# Patient Record
Sex: FEMALE | NOT HISPANIC OR LATINO
[De-identification: names, ages, dates, MRNs, and addresses within clinical notes are randomized per-mention and may not be internally consistent; named-entity substitution may affect disease eponyms.]

---

## 2022-04-25 ENCOUNTER — APPOINTMENT (OUTPATIENT)
Dept: CARDIOLOGY | Facility: CLINIC | Age: 77
End: 2022-04-25
Payer: MEDICARE

## 2022-04-25 ENCOUNTER — APPOINTMENT (OUTPATIENT)
Dept: ELECTROPHYSIOLOGY | Facility: CLINIC | Age: 77
End: 2022-04-25

## 2022-04-25 VITALS — SYSTOLIC BLOOD PRESSURE: 155 MMHG | HEART RATE: 75 BPM | DIASTOLIC BLOOD PRESSURE: 80 MMHG

## 2022-04-25 VITALS — BODY MASS INDEX: 26.9 KG/M2 | WEIGHT: 137 LBS | HEIGHT: 60 IN

## 2022-04-25 DIAGNOSIS — I10 ESSENTIAL (PRIMARY) HYPERTENSION: ICD-10-CM

## 2022-04-25 DIAGNOSIS — R00.2 PALPITATIONS: ICD-10-CM

## 2022-04-25 DIAGNOSIS — R01.1 CARDIAC MURMUR, UNSPECIFIED: ICD-10-CM

## 2022-04-25 PROBLEM — Z00.00 ENCOUNTER FOR PREVENTIVE HEALTH EXAMINATION: Status: ACTIVE | Noted: 2022-04-25

## 2022-04-25 PROCEDURE — 99204 OFFICE O/P NEW MOD 45 MIN: CPT

## 2022-04-25 NOTE — REVIEW OF SYSTEMS
[Palpitations] : palpitations [Negative] : Heme/Lymph [Fever] : no fever [Headache] : no headache [Chills] : no chills [Feeling Fatigued] : not feeling fatigued [SOB] : no shortness of breath [Dyspnea on exertion] : not dyspnea during exertion [Chest Discomfort] : no chest discomfort [Lower Ext Edema] : no extremity edema [Leg Claudication] : no intermittent leg claudication [Orthopnea] : no orthopnea [PND] : no PND [Syncope] : no syncope [Cough] : no cough [Wheezing] : no wheezing [Coughing Up Blood] : no hemoptysis [Abdominal Pain] : no abdominal pain [Nausea] : no nausea [Vomiting] : no vomiting [Diarrhea] : diarrhea

## 2022-04-25 NOTE — PHYSICAL EXAM
[No Xanthelasma] : no xanthelasma [Normal S1, S2] : normal S1, S2 [No Rub] : no rub [No Gallop] : no gallop [Soft] : abdomen soft [Non Tender] : non-tender [Normal Radial B/L] : normal radial B/L [No Rash] : no rash [Normal] : alert and oriented, normal memory [de-identified] : + JUAN 3/6 RSB

## 2022-04-25 NOTE — DISCUSSION/SUMMARY
[FreeTextEntry1] : 76 yo woman with HTN on meds, pre-DM HbA1C 6.1, , who presents for cardiac evaluation.  \par \par Palpitations noted to have PVCs on EKG and noted irregular rhythm on exam\par -Will obtain event monitor to rule out arrhythmias\par \par Murmur/PVCs/HTN\par -Will check TTE to rule out structural heart disease \par \par HTN above goal today which pt attributes to being nervous seeing a cardiologist.  Normally she is 130s/80s.  We discussed importance of  low salt intake, regular exercise per AHA guidelines.  ASCVD 12% 10 year cardiovascular risk.  She wishes to address it with lifestyle modifications at this point.  I recommend close repeat of labs with her PMD and then further discussion based on results.  \par \par I have spent 45 min on this encounter by seeing the pt, reviewing previous records, labs, etc, discussing plan of care and documenting this encounter.

## 2022-04-25 NOTE — HISTORY OF PRESENT ILLNESS
[FreeTextEntry1] : 76 yo woman with HTN on meds, pre-DM HbA1C 6.1, , who presents for cardiac evaluation.  Pt referred by Dr. Reina.  She does report occasional palpitations that she experiences when under stress and describes them as heart racing, resolves on its own.  Denies, CP, SOB, PND, orthopnea, syncope.  Had one episode of dizziness when she rapidly stood up after kneeling down, but has not had another episode.  Additionally was told she has a murmur on the last visit with her PMD.

## 2022-06-19 ENCOUNTER — FORM ENCOUNTER (OUTPATIENT)
Age: 77
End: 2022-06-19

## 2022-07-04 PROCEDURE — 93244 EXT ECG>48HR<7D REV&INTERPJ: CPT

## 2023-03-07 ENCOUNTER — OUTPATIENT (OUTPATIENT)
Dept: OUTPATIENT SERVICES | Facility: HOSPITAL | Age: 78
LOS: 1 days | End: 2023-03-07
Payer: MEDICARE

## 2023-03-07 ENCOUNTER — APPOINTMENT (OUTPATIENT)
Dept: CV DIAGNOSITCS | Facility: HOSPITAL | Age: 78
End: 2023-03-07

## 2023-03-07 DIAGNOSIS — R00.2 PALPITATIONS: ICD-10-CM

## 2023-03-07 PROCEDURE — 93356 MYOCRD STRAIN IMG SPCKL TRCK: CPT

## 2023-03-07 PROCEDURE — 93306 TTE W/DOPPLER COMPLETE: CPT | Mod: 26

## 2023-03-07 PROCEDURE — 93306 TTE W/DOPPLER COMPLETE: CPT

## 2023-05-16 ENCOUNTER — APPOINTMENT (OUTPATIENT)
Dept: CARDIOLOGY | Facility: CLINIC | Age: 78
End: 2023-05-16
Payer: MEDICARE

## 2023-05-16 ENCOUNTER — NON-APPOINTMENT (OUTPATIENT)
Age: 78
End: 2023-05-16

## 2023-05-16 VITALS
HEART RATE: 83 BPM | HEIGHT: 60 IN | BODY MASS INDEX: 26.9 KG/M2 | DIASTOLIC BLOOD PRESSURE: 86 MMHG | SYSTOLIC BLOOD PRESSURE: 142 MMHG | OXYGEN SATURATION: 98 % | WEIGHT: 137 LBS

## 2023-05-16 DIAGNOSIS — R93.1 ABNORMAL FINDINGS ON DIAGNOSTIC IMAGING OF HEART AND CORONARY CIRCULATION: ICD-10-CM

## 2023-05-16 DIAGNOSIS — I49.3 VENTRICULAR PREMATURE DEPOLARIZATION: ICD-10-CM

## 2023-05-16 PROCEDURE — 99213 OFFICE O/P EST LOW 20 MIN: CPT | Mod: 25

## 2023-05-16 PROCEDURE — 93000 ELECTROCARDIOGRAM COMPLETE: CPT

## 2023-05-16 RX ORDER — AMLODIPINE BESYLATE 10 MG/1
10 TABLET ORAL DAILY
Qty: 90 | Refills: 2 | Status: ACTIVE | COMMUNITY

## 2023-05-16 RX ORDER — LISINOPRIL 10 MG/1
10 TABLET ORAL DAILY
Qty: 1 | Refills: 3 | Status: ACTIVE | COMMUNITY

## 2023-05-31 PROBLEM — R93.1 ABNORMAL ECHOCARDIOGRAM: Status: ACTIVE | Noted: 2023-05-31

## 2023-05-31 PROBLEM — I49.3 PVC'S (PREMATURE VENTRICULAR CONTRACTIONS): Status: ACTIVE | Noted: 2022-04-25

## 2023-05-31 NOTE — PHYSICAL EXAM
[No Xanthelasma] : no xanthelasma [Normal S1, S2] : normal S1, S2 [No Rub] : no rub [No Gallop] : no gallop [Soft] : abdomen soft [Non Tender] : non-tender [Normal Radial B/L] : normal radial B/L [No Rash] : no rash [Normal] : alert and oriented, normal memory [de-identified] : + JUAN 3/6 RSB

## 2023-05-31 NOTE — HISTORY OF PRESENT ILLNESS
[FreeTextEntry1] : 79 yo woman with HTN on meds, pre-DM HbA1C 6.1, , who presents for a follow up.  Pt was previously referred by Dr. Reina and was seen by me in 2022.

## 2023-05-31 NOTE — DISCUSSION/SUMMARY
[FreeTextEntry1] : 79 yo woman with HTN on meds, pre-DM HbA1C 6.1, , who presents for a follow up.  \par \par PVCs on EKG \par -noted to have some OT PVCs but not related to symptoms.  For now will monitor.  Her TTE showed mildly reduced strain, thus will need repeat in about 6 months.  She may need MRI possibly depending on imaging results.  \par \par HTN above goal today but repeat by me 125/85.  Continue Norvasc 10 and Lisinopril.  We discussed importance of  low salt intake, regular exercise per AHA guidelines. Previously she wished to address cholesterol meds with PMD.  \par \par I have spent 20 min on this encounter by seeing the pt, reviewing previous records, labs, etc, discussing plan of care and documenting this encounter.   [EKG obtained to assist in diagnosis and management of assessed problem(s)] : EKG obtained to assist in diagnosis and management of assessed problem(s)

## 2023-05-31 NOTE — REVIEW OF SYSTEMS
[Fever] : no fever [Headache] : no headache [Chills] : no chills [Feeling Fatigued] : not feeling fatigued [SOB] : no shortness of breath [Dyspnea on exertion] : not dyspnea during exertion [Chest Discomfort] : no chest discomfort [Lower Ext Edema] : no extremity edema [Leg Claudication] : no intermittent leg claudication [Palpitations] : palpitations [Orthopnea] : no orthopnea [PND] : no PND [Syncope] : no syncope [Cough] : no cough [Wheezing] : no wheezing [Coughing Up Blood] : no hemoptysis [Abdominal Pain] : no abdominal pain [Nausea] : no nausea [Vomiting] : no vomiting [Diarrhea] : diarrhea [Negative] : Heme/Lymph